# Patient Record
Sex: MALE | Race: WHITE
[De-identification: names, ages, dates, MRNs, and addresses within clinical notes are randomized per-mention and may not be internally consistent; named-entity substitution may affect disease eponyms.]

---

## 2017-12-01 ENCOUNTER — HOSPITAL ENCOUNTER (EMERGENCY)
Dept: HOSPITAL 17 - NEPC | Age: 67
Discharge: HOME | End: 2017-12-01
Payer: COMMERCIAL

## 2017-12-01 VITALS — DIASTOLIC BLOOD PRESSURE: 88 MMHG | SYSTOLIC BLOOD PRESSURE: 164 MMHG

## 2017-12-01 VITALS
OXYGEN SATURATION: 99 % | TEMPERATURE: 99 F | SYSTOLIC BLOOD PRESSURE: 237 MMHG | HEART RATE: 80 BPM | RESPIRATION RATE: 18 BRPM | DIASTOLIC BLOOD PRESSURE: 103 MMHG

## 2017-12-01 VITALS
DIASTOLIC BLOOD PRESSURE: 90 MMHG | OXYGEN SATURATION: 99 % | RESPIRATION RATE: 18 BRPM | SYSTOLIC BLOOD PRESSURE: 176 MMHG | HEART RATE: 80 BPM

## 2017-12-01 VITALS — HEIGHT: 73 IN | BODY MASS INDEX: 24.84 KG/M2 | WEIGHT: 187.39 LBS

## 2017-12-01 VITALS
SYSTOLIC BLOOD PRESSURE: 213 MMHG | OXYGEN SATURATION: 97 % | RESPIRATION RATE: 18 BRPM | DIASTOLIC BLOOD PRESSURE: 105 MMHG | HEART RATE: 75 BPM

## 2017-12-01 VITALS
HEART RATE: 70 BPM | OXYGEN SATURATION: 97 % | RESPIRATION RATE: 19 BRPM | DIASTOLIC BLOOD PRESSURE: 108 MMHG | SYSTOLIC BLOOD PRESSURE: 210 MMHG

## 2017-12-01 VITALS
SYSTOLIC BLOOD PRESSURE: 181 MMHG | RESPIRATION RATE: 18 BRPM | OXYGEN SATURATION: 99 % | HEART RATE: 74 BPM | DIASTOLIC BLOOD PRESSURE: 85 MMHG

## 2017-12-01 DIAGNOSIS — R60.0: Primary | ICD-10-CM

## 2017-12-01 DIAGNOSIS — E78.00: ICD-10-CM

## 2017-12-01 DIAGNOSIS — Z79.82: ICD-10-CM

## 2017-12-01 DIAGNOSIS — Z79.899: ICD-10-CM

## 2017-12-01 DIAGNOSIS — I10: ICD-10-CM

## 2017-12-01 LAB
ALP SERPL-CCNC: 60 U/L (ref 45–117)
ALT SERPL-CCNC: 25 U/L (ref 12–78)
ANION GAP SERPL CALC-SCNC: 9 MEQ/L (ref 5–15)
APTT BLD: 26.9 SEC (ref 24.3–30.1)
AST SERPL-CCNC: 25 U/L (ref 15–37)
BASOPHILS # BLD AUTO: 0 TH/MM3 (ref 0–0.2)
BASOPHILS NFR BLD: 0.6 % (ref 0–2)
BILIRUB SERPL-MCNC: 0.5 MG/DL (ref 0.2–1)
BUN SERPL-MCNC: 13 MG/DL (ref 7–18)
CHLORIDE SERPL-SCNC: 92 MEQ/L (ref 98–107)
EOSINOPHIL # BLD: 0.2 TH/MM3 (ref 0–0.4)
EOSINOPHIL NFR BLD: 3.3 % (ref 0–4)
ERYTHROCYTE [DISTWIDTH] IN BLOOD BY AUTOMATED COUNT: 13 % (ref 11.6–17.2)
GFR SERPLBLD BASED ON 1.73 SQ M-ARVRAT: 86 ML/MIN (ref 89–?)
HCO3 BLD-SCNC: 26.1 MEQ/L (ref 21–32)
HCT VFR BLD CALC: 36.1 % (ref 39–51)
HEMO FLAGS: (no result)
INR PPP: 1 RATIO
LYMPHOCYTES # BLD AUTO: 1.3 TH/MM3 (ref 1–4.8)
LYMPHOCYTES NFR BLD AUTO: 25.1 % (ref 9–44)
MCH RBC QN AUTO: 31.9 PG (ref 27–34)
MCHC RBC AUTO-ENTMCNC: 35 % (ref 32–36)
MCV RBC AUTO: 91.2 FL (ref 80–100)
MONOCYTES NFR BLD: 16.3 % (ref 0–8)
NEUTROPHILS # BLD AUTO: 2.8 TH/MM3 (ref 1.8–7.7)
NEUTROPHILS NFR BLD AUTO: 54.7 % (ref 16–70)
PLATELET # BLD: 337 TH/MM3 (ref 150–450)
POTASSIUM SERPL-SCNC: 3.9 MEQ/L (ref 3.5–5.1)
PROTHROMBIN TIME: 10.2 SEC (ref 9.8–11.6)
RBC # BLD AUTO: 3.96 MIL/MM3 (ref 4.5–5.9)
SODIUM SERPL-SCNC: 127 MEQ/L (ref 136–145)
URATE SERPL-MCNC: 3.1 MG/DL (ref 2.6–7.2)
WBC # BLD AUTO: 5.2 TH/MM3 (ref 4–11)

## 2017-12-01 PROCEDURE — 85610 PROTHROMBIN TIME: CPT

## 2017-12-01 PROCEDURE — 83880 ASSAY OF NATRIURETIC PEPTIDE: CPT

## 2017-12-01 PROCEDURE — 85652 RBC SED RATE AUTOMATED: CPT

## 2017-12-01 PROCEDURE — 80053 COMPREHEN METABOLIC PANEL: CPT

## 2017-12-01 PROCEDURE — 71010: CPT

## 2017-12-01 PROCEDURE — 96374 THER/PROPH/DIAG INJ IV PUSH: CPT

## 2017-12-01 PROCEDURE — 86140 C-REACTIVE PROTEIN: CPT

## 2017-12-01 PROCEDURE — 93970 EXTREMITY STUDY: CPT

## 2017-12-01 PROCEDURE — 85025 COMPLETE CBC W/AUTO DIFF WBC: CPT

## 2017-12-01 PROCEDURE — 99285 EMERGENCY DEPT VISIT HI MDM: CPT

## 2017-12-01 PROCEDURE — 85730 THROMBOPLASTIN TIME PARTIAL: CPT

## 2017-12-01 PROCEDURE — 84550 ASSAY OF BLOOD/URIC ACID: CPT

## 2017-12-01 NOTE — RADRPT
EXAM DATE/TIME:  12/01/2017 18:21 

 

[facility] COMPARISON:     

No previous studies available for comparison.

        

 

 

INDICATIONS :                

Bilateral leg swelling. 

            

 

MEDICAL HISTORY :     

Hypercholesterolemia. Hypertension.  Hyperlipidemia. 

 

SURGICAL HISTORY :     

Tonsillectomy. Hernia repair. Discectomy. Left achilles and miniscus repair. Vasectomy. 

 

ENCOUNTER:     

Initial

 

ACUITY:     

1 week

 

PAIN SCORE:      

2/10

 

LOCATION:      

Bilateral  legs. 

                       

 

TECHNIQUE:     

Venous ultrasound of the left and right leg was performed from the inguinal ligament to the proximal 
calf.  Real-time, color Doppler and spectral tracing, compression and augmentation techniques were us
ed.  

 

FINDINGS:     

 

RIGHT LEG:     

There is normal compressibility of the deep venous system from the inguinal region to the proximal ca
lf.  No echogenic clot is seen in the lumen of the common femoral, femoral, popliteal, and posterior 
tibial veins.  There is a normal response of the venous system to proximal and distal augmentation an
d respiration.  

 

LEFT LEG:     

There is normal compressibility of the deep venous system from the inguinal region to the proximal ca
lf.  No echogenic clot is seen in the lumen of the common femoral, femoral, popliteal, and posterior 
tibial veins.  There is a normal response of the venous system to proximal and distal augmentation an
d respiration.  

 

CONCLUSION:     

Normal examination.  

 

 

 

 Demetrius Baker MD on December 01, 2017 at 19:12           

Board Certified Radiologist.

 This report was verified electronically.

## 2017-12-01 NOTE — RADRPT
EXAM DATE/TIME:  12/01/2017 18:11 

 

[facility] COMPARISON:     

No previous studies available for comparison.

 

                     

INDICATIONS :     

High blood pressure.

                     

 

MEDICAL HISTORY :     

Hypertension.          

 

SURGICAL HISTORY :     

None.   

 

ENCOUNTER:     

Initial                                        

 

ACUITY:     

1 day      

 

PAIN SCORE:     

2/10

 

LOCATION:     

Bilateral chest 

 

FINDINGS:     

A single view of the chest demonstrates the lungs to be symmetrically aerated without evidence of mas
s, infiltrate or effusion.  The cardiomediastinal contours are unremarkable.  Osseous structures are 
intact.

 

CONCLUSION:     

1. No acute findings. Osteoarthritis of the a.c. joints.

 

 

 

 Demetrius Baker MD on December 01, 2017 at 18:26           

Board Certified Radiologist.

 This report was verified electronically.

## 2017-12-01 NOTE — PD
HPI


Chief Complaint:  Hypertension


Time Seen by Provider:  17:50


Travel History


International Travel<30 days:  No


Contact w/Intl Traveler<30days:  No


Traveled to known affect area:  No





History of Present Illness


HPI


67-year-old male complains of redness and swelling of the left ankle.  Patient 

denies any injury to the ankle.  Patient states that the symptoms started about 

7 days ago.  Patient noticed his blood pressure was also elevated the past 

several days.  Patient has history of hypertension.  Patient has been taking 

Losartan and HCTZ was added a month ago.  Patient denies any history of 

diabetes.  Patient is a nonsmoker.  Patient denies history of DVT or PE.  

Patient denies history of gouty arthritis.  Patient denies any fever chills.  

Patient denies any recent long distant travel.





PFSH


Past Medical History


High Cholesterol:  Yes


Hypertension:  Yes


Influenza Vaccination:  Yes





Past Surgical History


Abdominal Surgery:  Yes (HERNIA)


Tonsillectomy:  Yes


Other Surgery:  Yes (VASECTOMY)





Social History


Alcohol Use:  Yes (DAILY COUPLE GLASSES WINE)


Tobacco Use:  No


Substance Use:  No





Allergies-Medications


(Allergen,Severity, Reaction):  


Coded Allergies:  


     No Known Allergies (Verified , 12/1/17)


Reported Meds & Prescriptions





Reported Meds & Active Scripts


Active


Hydrochlorothiazide 25 Mg Tab 25 Mg PO DAILY


Simvastatin 20 Mg Tab 20 Mg PO DAILY


Losartan (Losartan Potassium) 100 Mg Tab 100 Mg PO DAILY


Reported


Fish Oil (Omega-3 Fatty Acids) 60 Mg-90 Mg-500 Mg Cap   


Multiple Vitamin 1 Tab 1 Tab PO DAILY


Aspirin 81 Mg Chew 81 Mg CHEW ONCE








Review of Systems


General / Constitutional:  No: Fever


Eyes:  No: Visual changes


HENT:  No: Headaches


Cardiovascular:  No: Chest Pain or Discomfort


Respiratory:  No: Shortness of Breath


Gastrointestinal:  No: Abdominal Pain


Genitourinary:  No: Dysuria


Musculoskeletal:  Positive: Edema, No: Pain


Skin:  No Rash


Neurologic:  No: Weakness


Psychiatric:  No: Depression


Endocrine:  No: Polydipsia


Hematologic/Lymphatic:  No: Easy Bruising





Physical Exam


Narrative


GENERAL: Well-nourished, well-developed patient.


SKIN: Focused skin assessment warm/dry.


HEAD: Normocephalic.


EYES: No scleral icterus. No injection or drainage. 


NECK: Supple, trachea midline. No JVD or lymphadenopathy.


CARDIOVASCULAR: Regular rate and rhythm without murmurs, gallops, or rubs. 


RESPIRATORY: Breath sounds equal bilaterally. No accessory muscle use.


GASTROINTESTINAL: Abdomen soft, non-tender, nondistended. 


MUSCULOSKELETAL: Patient has mild redness of the skin associated with the left 

ankle on the medial and lateral malleolus and distal aspect the left lower leg.

  No heat noted.  No tenderness on palpation.  Mild edema dorsal aspect the 

left foot.  Patient has some mild soft tissue swelling over the right ankle 

also.  No tenderness on palpation the calf area.  Negative Homans sign.


BACK: Nontender without obvious deformity. No CVA tenderness. 


Neurologic exam normal.





Data


Data


Last Documented VS





Vital Signs








  Date Time  Temp Pulse Resp B/P (MAP) Pulse Ox O2 Delivery O2 Flow Rate FiO2


 


12/1/17 18:16  75 18 213/105 (141) 97   


 


12/1/17 17:55      Room Air  


 


12/1/17 17:38 99.0       








Orders





 Orders


Complete Blood Count With Diff (12/1/17 17:59)


Comprehensive Metabolic Panel (12/1/17 17:59)


B-Type Natriuretic Peptide (12/1/17 17:59)


Prothrombin Time / Inr (Pt) (12/1/17 17:59)


Act Partial Throm Time (Ptt) (12/1/17 17:59)


C-Reactive Protein (Crp) (12/1/17 17:59)


Westergren Sedimentation Rate (12/1/17 17:59)


Chest, Single Ap (12/1/17 17:59)


Iv Access Insert/Monitor (12/1/17 17:59)


Ecg Monitoring (12/1/17 17:59)


Oximetry (12/1/17 17:59)


Uric Acid (12/1/17 17:59)


Us Leg Venous Doppler Bilat (12/1/17 18:03)


Hydralazine Inj (Apresoline Inj) (12/1/17 18:15)





Labs





Laboratory Tests








Test


  12/1/17


18:07











[facility]


Medical Decision Making


Medical Screen Exam Complete:  Yes


Emergency Medical Condition:  Yes


Differential Diagnosis


Differential diagnosis including gouty arthritis, dependent edema, CHF, DVT, 

cellulitis.


Narrative Course


67-year-old male redness and swelling left ankle.  Mild swelling the right 

ankle also.  Patient has history hypertension on HCTZ.











Kirk Sood MD Dec 1, 2017 18:12

## 2017-12-01 NOTE — PD
Physical Exam


Narrative


GENERAL: Well-nourished, well-developed patient.


SKIN: Patient has swelling of his bilateral ankles, there is redness to the 

bilateral lower legs with left greater than right.  There is no induration or 

abscess or crepitus


HEAD: Normocephalic and atraumatic.


EYES: No injection or drainage. 


ENT: No nasal drainage noted. 


NECK: Supple, trachea midline.


CARDIOVASCULAR: Regular rate and rhythm 


RESPIRATORY: No increased effort. No accessory muscle use.


EXTREMITIES: No significant specific joint pain, neurovascularly intact, no 

lacerations over, compartments soft.


NEUROLOGICAL: Awake and alert. Motor and sensory grossly within normal limits. 

Normal speech.





Data


Data


Last Documented VS





Vital Signs








  Date Time  Temp Pulse Resp B/P (MAP) Pulse Ox O2 Delivery O2 Flow Rate FiO2


 


12/1/17 19:28  74 18 181/85 (117) 99 Room Air  


 


12/1/17 17:38 99.0       








Orders





 Orders


Complete Blood Count With Diff (12/1/17 17:59)


Comprehensive Metabolic Panel (12/1/17 17:59)


B-Type Natriuretic Peptide (12/1/17 17:59)


Prothrombin Time / Inr (Pt) (12/1/17 17:59)


Act Partial Throm Time (Ptt) (12/1/17 17:59)


C-Reactive Protein (Crp) (12/1/17 17:59)


Westergren Sedimentation Rate (12/1/17 17:59)


Chest, Single Ap (12/1/17 17:59)


Iv Access Insert/Monitor (12/1/17 17:59)


Ecg Monitoring (12/1/17 17:59)


Oximetry (12/1/17 17:59)


Uric Acid (12/1/17 17:59)


Us Leg Venous Doppler Bilat (12/1/17 18:03)


Hydralazine Inj (Apresoline Inj) (12/1/17 18:15)


Ed Discharge Order (12/1/17 20:20)





Labs





Laboratory Tests








Test


  12/1/17


18:07


 


White Blood Count 5.2 TH/MM3 


 


Red Blood Count 3.96 MIL/MM3 


 


Hemoglobin 12.6 GM/DL 


 


Hematocrit 36.1 % 


 


Mean Corpuscular Volume 91.2 FL 


 


Mean Corpuscular Hemoglobin 31.9 PG 


 


Mean Corpuscular Hemoglobin


Concent 35.0 % 


 


 


Red Cell Distribution Width 13.0 % 


 


Platelet Count 337 TH/MM3 


 


Mean Platelet Volume 6.8 FL 


 


Neutrophils (%) (Auto) 54.7 % 


 


Lymphocytes (%) (Auto) 25.1 % 


 


Monocytes (%) (Auto) 16.3 % 


 


Eosinophils (%) (Auto) 3.3 % 


 


Basophils (%) (Auto) 0.6 % 


 


Neutrophils # (Auto) 2.8 TH/MM3 


 


Lymphocytes # (Auto) 1.3 TH/MM3 


 


Monocytes # (Auto) 0.8 TH/MM3 


 


Eosinophils # (Auto) 0.2 TH/MM3 


 


Basophils # (Auto) 0.0 TH/MM3 


 


CBC Comment DIFF FINAL 


 


Differential Comment  


 


Erythrocyte Sedimentation Rate 15 mm/hr 


 


Prothrombin Time 10.2 SEC 


 


Prothromb Time International


Ratio 1.0 RATIO 


 


 


Activated Partial


Thromboplast Time 26.9 SEC 


 


 


Blood Urea Nitrogen 13 MG/DL 


 


Creatinine 0.89 MG/DL 


 


Random Glucose 93 MG/DL 


 


Total Protein 7.2 GM/DL 


 


Albumin 3.8 GM/DL 


 


Calcium Level 8.8 MG/DL 


 


Uric Acid 3.1 MG/DL 


 


Alkaline Phosphatase 60 U/L 


 


Aspartate Amino Transf


(AST/SGOT) 25 U/L 


 


 


Alanine Aminotransferase


(ALT/SGPT) 25 U/L 


 


 


Total Bilirubin 0.5 MG/DL 


 


Sodium Level 127 MEQ/L 


 


Potassium Level 3.9 MEQ/L 


 


Chloride Level 92 MEQ/L 


 


Carbon Dioxide Level 26.1 MEQ/L 


 


Anion Gap 9 MEQ/L 


 


Estimat Glomerular Filtration


Rate 86 ML/MIN 


 


 


C-Reactive Protein 1.60 MG/DL 


 


B-Type Natriuretic Peptide 70 PG/ML 











MDM


Supervised Visit with ILIANA:  No


Interpretation(s)


BNP is normal


Narrative Course


Signed over to me to follow BNP and if normal to discharge





BNP is normal.  Lengthy discussion with patient and wife and they agree to keep 

blood pressure log and follow closely as an outpatient for medication 

adjustment.  No emergent indication for antibiotic at this time but patient and 

wife understand that if the redness worsens or if he develops a fever he needs 

to return for antibiotics.  Patient could be having inflammatory arthritis and 

for this was recommended anti-inflammatories.  He agrees to plan of care and 

return instructions.


Diagnosis





 Primary Impression:  


 Leg edema


 Additional Impression:  


 Hypertension


 Qualified Codes:  I10 - Essential (primary) hypertension


Patient Instructions:  General Instructions





***Additional Instruction:  


return as needed, follow with primary monday, keep blood pressure log, elevate 

legs at rest


***Med/Other Pt SpecificInfo:  No Change to Meds


Disposition:  01 DISCHARGE HOME


Condition:  Stable











Karen Ignacio MD Dec 1, 2017 20:26

## 2018-01-11 ENCOUNTER — HOSPITAL ENCOUNTER (OUTPATIENT)
Dept: HOSPITAL 17 - HDOC | Age: 68
Discharge: HOME | End: 2018-01-11
Attending: INTERNAL MEDICINE
Payer: COMMERCIAL

## 2018-01-11 VITALS
SYSTOLIC BLOOD PRESSURE: 150 MMHG | RESPIRATION RATE: 18 BRPM | TEMPERATURE: 98.2 F | HEART RATE: 65 BPM | OXYGEN SATURATION: 97 % | DIASTOLIC BLOOD PRESSURE: 88 MMHG

## 2018-01-11 VITALS — WEIGHT: 188.05 LBS | BODY MASS INDEX: 24.92 KG/M2 | HEIGHT: 73 IN

## 2018-01-11 DIAGNOSIS — R94.39: ICD-10-CM

## 2018-01-11 DIAGNOSIS — I50.30: ICD-10-CM

## 2018-01-11 DIAGNOSIS — Z01.818: ICD-10-CM

## 2018-01-11 DIAGNOSIS — I25.10: Primary | ICD-10-CM

## 2018-01-11 DIAGNOSIS — Z01.810: ICD-10-CM

## 2018-01-11 DIAGNOSIS — I11.0: ICD-10-CM

## 2018-01-11 DIAGNOSIS — I77.810: ICD-10-CM

## 2018-01-11 DIAGNOSIS — E78.5: ICD-10-CM

## 2018-01-11 DIAGNOSIS — E87.1: ICD-10-CM

## 2018-01-11 LAB
BASOPHILS # BLD AUTO: 0.1 TH/MM3 (ref 0–0.2)
BASOPHILS NFR BLD: 1 % (ref 0–2)
BUN SERPL-MCNC: 13 MG/DL (ref 7–18)
CALCIUM SERPL-MCNC: 9.1 MG/DL (ref 8.5–10.1)
CHLORIDE SERPL-SCNC: 98 MEQ/L (ref 98–107)
CREAT SERPL-MCNC: 1.05 MG/DL (ref 0.6–1.3)
EOSINOPHIL # BLD: 0.6 TH/MM3 (ref 0–0.4)
EOSINOPHIL NFR BLD: 10 % (ref 0–4)
ERYTHROCYTE [DISTWIDTH] IN BLOOD BY AUTOMATED COUNT: 13.1 % (ref 11.6–17.2)
GFR SERPLBLD BASED ON 1.73 SQ M-ARVRAT: 71 ML/MIN (ref 89–?)
GLUCOSE SERPL-MCNC: 84 MG/DL (ref 74–106)
HCO3 BLD-SCNC: 27.6 MEQ/L (ref 21–32)
HCT VFR BLD CALC: 38.7 % (ref 39–51)
HGB BLD-MCNC: 13.5 GM/DL (ref 13–17)
INR PPP: 1 RATIO
LYMPHOCYTES # BLD AUTO: 1.4 TH/MM3 (ref 1–4.8)
LYMPHOCYTES NFR BLD AUTO: 26.3 % (ref 9–44)
MCH RBC QN AUTO: 31.8 PG (ref 27–34)
MCHC RBC AUTO-ENTMCNC: 34.8 % (ref 32–36)
MCV RBC AUTO: 91.3 FL (ref 80–100)
MONOCYTE #: 0.8 TH/MM3 (ref 0–0.9)
MONOCYTES NFR BLD: 15.2 % (ref 0–8)
NEUTROPHILS # BLD AUTO: 2.6 TH/MM3 (ref 1.8–7.7)
NEUTROPHILS NFR BLD AUTO: 47.5 % (ref 16–70)
PLATELET # BLD: 390 TH/MM3 (ref 150–450)
PMV BLD AUTO: 6.9 FL (ref 7–11)
PROTHROMBIN TIME: 9.9 SEC (ref 9.8–11.6)
RBC # BLD AUTO: 4.24 MIL/MM3 (ref 4.5–5.9)
SODIUM SERPL-SCNC: 133 MEQ/L (ref 136–145)
WBC # BLD AUTO: 5.5 TH/MM3 (ref 4–11)

## 2018-01-11 PROCEDURE — 92928 PRQ TCAT PLMT NTRAC ST 1 LES: CPT

## 2018-01-11 PROCEDURE — C1769 GUIDE WIRE: HCPCS

## 2018-01-11 PROCEDURE — C1893 INTRO/SHEATH, FIXED,NON-PEEL: HCPCS

## 2018-01-11 PROCEDURE — 93458 L HRT ARTERY/VENTRICLE ANGIO: CPT

## 2018-01-11 PROCEDURE — 80048 BASIC METABOLIC PNL TOTAL CA: CPT

## 2018-01-11 PROCEDURE — C1725 CATH, TRANSLUMIN NON-LASER: HCPCS

## 2018-01-11 PROCEDURE — 85730 THROMBOPLASTIN TIME PARTIAL: CPT

## 2018-01-11 PROCEDURE — 99153 MOD SED SAME PHYS/QHP EA: CPT

## 2018-01-11 PROCEDURE — 93005 ELECTROCARDIOGRAM TRACING: CPT

## 2018-01-11 PROCEDURE — C1887 CATHETER, GUIDING: HCPCS

## 2018-01-11 PROCEDURE — C1874 STENT, COATED/COV W/DEL SYS: HCPCS

## 2018-01-11 PROCEDURE — 85025 COMPLETE CBC W/AUTO DIFF WBC: CPT

## 2018-01-11 PROCEDURE — 99152 MOD SED SAME PHYS/QHP 5/>YRS: CPT

## 2018-01-11 PROCEDURE — 85610 PROTHROMBIN TIME: CPT

## 2018-01-11 NOTE — MA
cc:

SUMIT BURT

****

 

 

DATE: 2018

 

 1950

 

PROCEDURE PERFORMED

1.  Left heart catheterization.

2.  Selective right and left coronary angiography.

3.  Left ventriculogram and hemodynamics.

4.  Successful PCI, RITO to proximal LAD.

 

APPROACH

Right transradial.

 

INDICATION

Positive stress test with complaints of dyspnea on exertion.

 

DESCRIPTION OF PROCEDURE

Consent signed.  The patient was brought into the cardiac cath lab in fasting 
state.  The right groin was prepped and draped in sterile fashion, using 1% 
lidocaine for local anesthesia and micropuncture kit, a 6-Italian sheath was 
inserted into the right radial artery. Antispasmodic cocktail given then 
selective right and left coronary angiography was performed with a JR-4 and a JL
-4 diagnostic catheters.  Angiography was taken in multiple views.  The JR-4 
diagnostic catheter was introduced over the wire to the ventricle.  This was 
followed by pressure recordings, left ventriculogram on pullback.  We

identified a significant lesion on the proximal LAD of 85%.  The remaining of 
the LAD had no significant obstruction with minimal luminal irregularities 
throughout and small flow for which we proceeded to fix by percutaneous 
intervention. For this the LAD was engaged with EBU 3 5 guide.  Then the LAD 
was wired with a run-through wire which was anchored distally in the LAD. This 
was followed by pre-dilation of the proximal LAD lesion with a 2.5 12 compliant 
balloon followed by insertion and deployment of a 3.5 12 drug-eluting stent. 
The stent was postdilated with a noncompliant 4 0 balloon to high atmospheres. 
Final angiographic views revealed good stent position and expansion with MURALI 
III flow.  The patient tolerated the procedure well without complications. 
Estimated blood loss less than 10 ccs. Total contrast used 100 ccs. The right 
radial access site was closed with a TR band.  The patient was loaded with 
Effient after the procedure.

 

RESULTS

 

LEFT VENTRICLE

The left ventricular pressure were 84/-3 with an LVEDP of 3. The aortic 
pressure was 97/52 with a mean of 71.  There was no gradient upon pullback from 
the left ventricle to the aorta.  Left ventriculogram revealed a symmetrically 
idania ventricle with an estimated ejection fraction of 60%.

 

ANGIOGRAPHY

1.  The right coronary artery is a small and nondominant vessel.  It has no 
significant obstructive coronary artery disease.

2.  The left main is very short and is giving off the LAD and the left 
circumflex artery.

3.  The LAD is a transapical vessel.  It has a significant 85% lesion on its 
proximal segment, is giving off one main diagonal vessel which is patent with 
MURALI III flow.  The remainder of the LAD has minimal luminal irregularities, 
tortuosity and some calcification, however no significant obstructions.  

4. The left circumflex artery is a dominant artery giving the PDA.  It does 
have some minimal luminal irregularities, has a 10% lesion proximally.  It is 
giving off two diagonal vessels and the PDA all of which are patent with MURALI 
III flow and nonobstructive coronary artery disease.

 

CONCLUSION

1.  Successful PCI / RITO to proximal LAD in the setting of positive stress test 
and complaints of YAO.

2.  Preserved LV systolic function.

 

RECOMMENDATIONS

Transfer to the DOC unit for post cath care with IV hydration for the next 4 
hours and continue dual antiplatelet agent with aspirin and Effient as well as 
aggressive medical management for secondary prevention of CAD. He should follow 
up with Dr. Harmon upon discharge.

 

 

                              _________________________________

                              Sumit Burt MD, MPH, [facility]

 

PCA/TLL

D:  2018/9:54 AM

T:  2018/10:15 AM

Visit #:  [id number]

Job #:  [id number]

[facility]

## 2018-01-11 NOTE — EKG
Date Performed: 01/11/2018       Time Performed: 07:32:18

 

PTAGE:      67 years

 

EKG:      Sinus rhythm 

 

. Normal ECG 

 

NO PREVIOUS TRACING            

 

DOCTOR:   Genna Tello  Interpretating Date/Time  01/11/2018 15:57:22

## 2018-01-11 NOTE — CATHPROC
[facility] HIS Report

Study Information

Study Number    Admission           Scheduled Start            Study Start

 

[id number]    Jan 11 2018 6:57AM      01/11/2018 Jan 11 2018 8:27AM

 

Universal Service

 

Cardiac Catheterization

 

Admit Source               Facility Department

 

Other                   [facility] - Cath Lab

 

Physician and Clinical Staff

Initial MD Meier, Sumit         Circulator     Valerie Day BSN

 

                         Recorder      Kimberlyn Dias,RT(R) (BS)

 

                         Scrub        Eleazar Pratt,RT(R)

 

Procedures Performed

Procedure                     Location (Site)           Vessel Name

 

Drug Eluting Inflatio               LAD Prox              Left Coronary

L Heart Cath

LV Gram-hand inj. LV               LV                 Ventricle

PTCA                       LAD Prox              Left Coronary

Wire insertion                  Radial (right)           Radial Art.

Equipment

Time            Description           Size       Mfg Part Number  Used/Scraped

                   COPILOT VALVE, BLEEDBACK              [id number]

09:33    [employer]                                     Used

                   CONTROL                      *[id number]

                   TRANSDUCER, TRUWAVE                [id number]

08:40    [employer]                      *                  Used

                   W/STOCKCOCK                    *[id number]

                                             [phone]



                                             *[id number]

                                             [phone]



                                             *[id number]

                                             [id number]

08:40    [employer]    PACK, CCL CUSTOM         *                  Used

                                             *[id number]

08:40    [employer]    SUPPORT, ARTERIAL ADULT              [id number] *[id number] Used

                                             [id number]

09:34    [employer]        BALLOON, 2.5 X 12MM EUPHORA 12MM                   Used

                                             *[id number]

                   BALLOON, 4.0 X 8MM NC               [id number]

09:46    [employer]                         8MM                 Used

                   EUPHORA                      *[id number]

                                             [id number]

08:50    [employer]        JL 3.5 DXTERITY CATHETER     FR 5                Used

                                             *[id number]

                                             [id number]

09:41    [employer]        STENT, 3.5 12MM KAITLYNN       3.5 12MM              Used

                                             *[id number]

                                             [id number]

09:38    [employer]        STENT, 3.5 15MM KAITLYNN       3.5 15MM              Used

                                             *[id number]

                                             [id number]

09:32    [employer]/AVE      EBU 3.5 Z2 GUIDE CATHETER    FR 6                Used

                                             *[id number]

                                             [id number]

09:33    [employer]      30 KATHY INDEFLATOR                          Used

                                             *[id number]

                   BAND, RADIAL COMPRESSION TR            [id number]

09:49    [employer]                       24CM                Used

                   SHORT 24                      *[id number]

                                             [id number]

08:40    [employer]      WIRE, 3MMJ .035 180CM      180CM                Used

                                             *[id number]

                                             [id number]

08:40    [employer]          MANIFOLD, 4 PORT         *                  Used

                                             *[id number]

08:40    [employer]         OMNIPAQUE, 350 MG, 150ML     150ML       [id number]      Used

                                             [id number]

08:40    [employer]      BLANKET,WARM AIR CCL       *                  Used

                                             *[id number]

                   SHEATH, FR6 TRANSRADIAL              [id number]

08:40    [employer]                      FR 6                Used

                   SLENDER 10CM                    *[id number]

                   WIRE, RUNTHROUGH NS FLOPPY             

09:32    [employer]                      180CM                Used

                   .014 180CM                     *[id number]

 

Equipment Model, Serial, Lot Number and Expiration Data

Description              Model Number       Serial Number    Lot Number       Expiration Date

 

BALLOON, 4.0 X 8MM NC EUPHORA                           [id number]        09-

 

STENT, 3.5 12MM KAITLYNN         [id number]                 [id number]       10-

 

History: Current Medications

Medication         Dosage/Unit       Route      Frequency   Last Date/Time Taken

 

Beta Blocker

 

Statins (any)

 

ASA

 

 

History: Allergies

Allergy              Reaction

 

No Known Allergies

History: Risk Factors

                      Family History of

Hypertension    Dyslipidemia                   Previous MI    Previous Heart Failure

                      Premature CAD

Yes         Yes           Yes            No         Yes

Prior Valve

          Prior PCI        Prior CABG

Surgery

No         No           No

          Cerebrovascular     Peripheral Artery     Chronic Lung

On Dialysis                                       Diabetes

          Disease         Disease          Disease

No         No           No            No         No

 

 

History: Stress Tests

Stress or Imaging Studies Performed

 

Yes

Standard Exercise Stress

Test

No

 

Stress Echo

 

No

 

Stress Test SPECT        Stress Test SPECT Result    Stress Test SPECT Ischemia Risk/Extent

 

Yes               Positive            Low

 

Stress Test CMR

 

No

 

Cardiac CTA           Coronary Calcium Score

 

No               No

 

 

History: Other

Current Smoker

 

No

 

Labs

Hgb (g/dl)        Hct (%)         WBC (l/cumm)        Platelets (thousands)

 

11.60-17.00       35.00-51.00       4.00-11.00         150..00

 

13.5           38.7          5.5             390

 

Glucose (mg/dl)     BUN (mg/dl)       Creatinine (mg/dl)    BUN:Creatinine (1:x)

74..00       7.00-18.00       0.50-1.30         10.00-20.00

 

84            13           1.0            13

 

Na (meq/l)        K (meq/l)

 

136..00      3.50-5.10

 

133           4.3

 

INR (PTT:PT)

 

0.90-1.10

 

1

 

CPK-MB (ng/ML)

 

0.50-3.60

 

Not Drawn

Medication

Medication Total Dose (Bolus/Oral)

Medication              Total Dosage/Unit

 

1% XYLOCAINE                 1 mL

 

EFFIENT                  60 mg

 

FENTANYL                  100 mcg

 

HEPARIN                 6000 units

 

RADIAL COCKTAIL               1 units

 

VERSED                    4 mg

 

Medications (Bolus/Oral)

Medication          Time Given          Dosage/Unit       Administered By      Reason

 

VERSED            1/11/2018 9:14:39 AM      2 mg         Valerie Day

2 mg VERSED given in lab by Valerie Day BSN in Left Antecubital via Peripheral IV.

 

1% XYLOCAINE         1/11/2018 9:15:17 AM      1 mL         Baker-Thu, Sumit

1 mL 1% XYLOCAINE given in lab by BakerAntThu Sumit in Right Radial via Subcutaneous.

 

FENTANYL           1/11/2018 9:15:46 AM      50 mcg         Valerie Day

50 mcg FENTANYL given in lab by Valerie Day BSN in Left Antecubital via Peripheral IV.

 

                                                       Ntg 200mcg Verapamil 2.5mg Heparin

RADIAL COCKTAIL        1/11/2018 9:16:30 AM      1 units        Valerie Day

                                                       3000U

1 units RADIAL COCKTAIL given in Right Radial via Radial. Reason: Ntg 200mcg Verapamil 2.5mg Heparin 
2500U.

 

VERSED            1/11/2018 9:18:57 AM      1 mg         Valerie Day

1 mg VERSED given in lab by Valerie Day BSN in Left Antecubital via Peripheral IV.

 

FENTANYL           1/11/2018 9:19:03 AM      25 mcg         Valerie Day

25 mcg FENTANYL given in lab by Valerie Day BSN in Left Antecubital via Peripheral IV.

 

HEPARIN            1/11/2018 9:30:54 AM     6000 units        Valerie Day

6000 units HEPARIN given in lab by Valerie Day BSN in Left Antecubital via Peripheral IV.

 

VERSED            1/11/2018 9:31:09 AM      1 mg         Barb Valerie

1 mg VERSED given in lab by Valerie Day BSN in Left Antecubital via Peripheral IV.

 

FENTANYL           1/11/2018 9:32:20 AM      25 mcg         Valerie Day

25 mcg FENTANYL given in lab by Valerie Day BSN in Left Antecubital via Peripheral IV.

 

EFFIENT            1/11/2018 9:56:31 AM      60 mg         Valerie Day

60 mg EFFIENT given in lab by Valerie Day BSN via Oral.

 

Medication (Drip)

Medication          Time Given          Dosage/Unit      Concentration/Unit  Diluent (ml)  Solution

 

IV Solutions         1/11/2018 8:27:32 AM      0 mL (IV)                 500       NaCl .9

IV Solutions given in lab by Valerie Day BSN in Left Antecubital via Peripheral IV. Pump/Dri
p Flow = 30 ml/hr using NaCl .9.

Initial Case Assessment

Cardiovascular

HR           Rhythm          NIBP          Chest Pain

 

66           reg            149/83         0

 

Edema Present      Skin color            Skin

 

None           Normal              Warm Dry

 

Circulatory - Right Pulses

Dorsalis Pedis     Femoral          Radial

 

1            3             3

 

Scale (0,1,2,3,4,d)

 

Scale (0,1,2,3,4,d)

 

Circulatory - Lower Extremities

Color Lower Right    Color Lower Left

 

 

Normal         Normal

 

Neurological State

            Oriented to time-place-

Alert                      Moves all extremities

            person

 

Respiration - General

Respiration Rate

            SpO2 (%)

(B/min)

10           98

 

Chronological Log

Time    Study Chronological Log

 

8:27:15   Patient arrived via Bed.

 

8:27:16   Patient Name, D.O.B, / Armband Verified By R.N.

 

8:27:16   Consent signed by the physician and the patient and verified by the Cath Lab staff.

 

8:27:17   Pre-op and post- op instructions given; patient acknowledges understanding of instructions.


 

8:27:18   Verbal Stimulation=2 Physical Stimulation=2 Airway=2 Respiration=2 TOTAL=8. (0=absent, 1=li
mited, 2=present)

 

8:27:19   Presedation assessment performed by Cath Lab RN.

 

8:27:24   Allens test performed on the right radial and ulnar artery.

 

8:27:27   Patient has been NPO for More than 6Hrs.

 

8:27:28   Skin Breakdown none per pt

 

8:27:28   Patient Warmer Placed on the Table.

 

8:27:30   Lilia Prominences Protected

 

8:27:31   A # 20 IV was noted in the Antecubital (left). Grade = 0

      IV Solutions given in lab by Valerie Day BSN in Left Antecubital via Peripheral IV. Pu
mp/Drip Flow = 30 ml/hr

8:27:32

      using NaCl .9.

8:27:33   History and physical on the chart or being dictated.

     Assessment: Initial Case, HR=66 BPM, Rhythm=reg, WPMP=616/83 mmhg, Chest Pain=0, Edema=None, Col
or=Normal,

     Skin = Warm, Dry

     Right Pulses: Pato Ped=1, Femoral=3, Radial=3

8:27:36  Lower Right Extremities: Color=Normal

     Lower Left Extremities: Color=Normal

     Neurological: State=Alert, Ox3, MURPHY

     Respiration: Resp=10 B/min, SpO2=98 %

     Vitals capture started with the following parameters, Patient=Adult, Interval=5 min, Initial Pre
njklc=805 mmHg,

8:33:41

     Deflation Rate=5 mmHg, Cuff placed on Left Arm

8:34:16  Reference ECG taken

 

8:34:55  HR=66 bpm, VWDD=040/83 mmhg, SpO2=98.0 %, Resp=14 B/min, Pain=0, Lilliana=10, Strong=2

 

8:38:35  Right Radial and right groin prepped with 2% chlorhexidine, and draped after a 3 min. waitin
g time.

 

8:39:21  HR=65 bpm, ZNWI=339/84 mmhg, SpO2=98.0 %, Resp=11 B/min, Pain=0, Lilliana=10, Strong=2

 

8:44:22  HR=65 bpm, KJFA=522/77 mmhg, NpR2=034.0 %, Resp=10 B/min, Pain=0, Lilliana=10, Strong=2

 

8:44:51  MD paged

 

8:45:53  Pressure channel 1 zeroed.

 

8:47:59  MD responded

 

8:49:25  HR=65 bpm, VMXP=539/81 mmhg, DfU5=662.0 %, Resp=8 B/min, Pain=0, Lilliana=10, Strong=2

 

8:54:24  HR=64 bpm, DHHY=580/78 mmhg, NyB0=720.0 %, Resp=12 B/min, Pain=0, Lilliana=10, Strong=2

 

8:59:24  HR=66 bpm, HHJZ=047/77 mmhg, SpO2=99.0 %, Resp=10 B/min, Pain=0, Lilliana=10, Strong=2

 

9:04:23  HR=64 bpm, OAHY=167/80 mmhg, SpO2=99.0 %, Resp=11 B/min, Pain=0, Lilliana=10, Strong=2

 

9:07:11  MD arrived

 

9:09:26  HR=67 bpm, MNOJ=204/78 mmhg, DdW0=830.0 %, Resp=17 B/min, Pain=0, Lilliana=10, Strong=2

 

9:14:23  HR=68 bpm, UNGQ=315/87 mmhg, SpO2=99.0 %, Resp=11 B/min, Pain=0, Lilliana=10, Strong=2

     Time Out. Correct patient, correct procedure, correct physician, power injector not loaded with 
contrast with surgical

9:14:23

     team present. Time Out Concurred by MD and individual staff in procedure.

9:14:34  Case Start

 

9:14:39  2 mg VERSED given in lab by Valerie Day BSN in Left Antecubital via Peripheral IV.

 

9:15:17  1 mL 1% XYLOCAINE given in lab by Sumit Meier in Right Radial via Subcutaneous.

 

9:15:46  50 mcg FENTANYL given in lab by Valerie Day BSN in Left Antecubital via Peripheral 
IV.

 

9:16:10  Access site was right Radial Artery.

     A SHEATH, FR6 TRANSRADIAL SLENDER 10CM FR 6 was advanced into the Radial (right) using the Sushil mcclendon

9:16:20

     technique.

9:16:30  1 units RADIAL COCKTAIL given in Right Radial via Radial. Reason: Ntg 200mcg Verapamil 2.5mg
 Heparin 2500U.

     A JR 4.0 INFINITI CATHETER FR 5 was advanced over a wire. OMNIPAQUE, 350 MG, 150ML 150ML was use
d for

9:17:02

     injections.

     Recorded Pressure: LV, HR=70, Condition=Condition 1

9:17:29

     (Left Ventricle) /-3/5

9:17:38  The LV was manually injected with 8 cc's and visualized. OMNIPAQUE, 350 MG, 150ML 150ML used
.

     Recorded Pressure: LV, Ao, HR=76, Condition=Condition 1

9:17:54  (Left Ventricle) LV 84/-3/3,

     (Aorta) Ao 107/58/81

9:18:57  1 mg VERSED given in lab by Valerie Day , CATHERINEN in Left Antecubital via Peripheral IV.

 

9:19:03  25 mcg FENTANYL given in lab by Valerie Day , BSN in Left Antecubital via Peripheral 
IV.

 

9:19:26  HR=71 bpm, TCXX=927/71 mmhg, SpO2=95.0 %, Resp=13 B/min, Pain=0, Lilliana=10, Strong=2

     After removing the current catheter a JL 3.5 DXTERITY CATHETER FR 5 was advanced over a WIRE, 3M
MJ .035 180CM

9:19:54

     180CM.

9:23:30  Catheter was removed

     A AL 1 INFINITI CATHETER FR 5 was advanced over a wire. OMNIPAQUE, 350 MG, 150ML 150ML was used 
for

9:23:47

     injections.

9:24:21  HR=66 bpm, NLEO=044/70 mmhg, SpO2=94.0 %, Resp=10 B/min, Pain=0, Lilliana=10, Strong=2

     Recorded Pressure: Ao, HR=65, Condition=Condition 1

9:25:26

     (Aorta) Ao 97/52/71

9:28:52  Catheter was removed

 

9:29:24  HR=66 bpm, ATJI=037/70 mmhg, SpO2=95.0 %, Resp=11 B/min, Pain=0, Lilliana=10, Strong=2

 

9:30:54  6000 units HEPARIN given in lab by Valerie Day BSN in Left Antecubital via Peripher
al IV.

 

9:31:09  1 mg VERSED given in lab by Valerie Day , BSN in Left Antecubital via Peripheral IV.

     A EBU 3.5 Z2 GUIDE CATHETER FR 6 was advanced over a wire. OMNIPAQUE, 350 MG, 150ML 150ML was us
ed for

9:31:36

     injections.

9:32:20  25 mcg FENTANYL given in lab by Valerie Day , BSN in Left Antecubital via Peripheral 
IV.

 

9:32:37  A WIRE, RUNTHROUGH NS FLOPPY .014 180CM 180CM was inserted via Radial (right).

     A BALLOON, 2.5 X 12MM EUPHORA 12MM was inserted over WIRE, RUNTHROUGH NS FLOPPY .014 180CM 180CM
 via

9:34:06

     the Radial (right).

9:34:23  HR=64 bpm, OVBS=396/67 mmhg, SpO2=95.0 %, Resp=10 B/min, Pain=0, Lilliana=10, Strong=2

     A BALLOON, 2.5 X 12MM EUPHORA 12MM was inserted over WIRE, RUNTHROUGH NS FLOPPY .014 180CM 180CM
 via

9:36:36

     the Radial (right).

     A BALLOON, 2.5 X 12MM EUPHORA 12MM over a WIRE, RUNTHROUGH NS FLOPPY .014 180CM 180CM in the LAD


9:36:49

     Prox was inflated using a 30 KATHY INDEFLATOR at 10 kathy for 20 sec.

9:37:21  Balloon Removed

     A STENT, 3.5 15MM KAITLYNN 3.5 15MM was advanced through a EBU 3.5 Z2 GUIDE CATHETER FR 6 over a WIR
E,

9:38:04

     RUNTHROUGH NS FLOPPY .014 180CM 180CM.

9:39:24  HR=64 bpm, HGBO=842/71 mmhg, SpO2=96.0 %, Resp=15 B/min, Pain=0, Lilliana=10, Strong=2

 

9:41:04  Stent not deployed. Stent removed and intact.

     A STENT, 3.5 12MM KAITLYNN 3.5 12MM was advanced through a EBU 3.5 Z2 GUIDE CATHETER FR 6 over a WIR
E,

9:41:44

     RUNTHROUGH NS FLOPPY .014 180CM 180CM.

9:43:19  Stent not deployed. Stent removed and intact.

 

9:44:25  HR=64 bpm, TAIV=667/72 mmhg, SpO2=97.0 %, Resp=12 B/min, Pain=0, Lilliana=10, Strong=2

     A STENT, 3.5 12MM KAITLYNN 3.5 12MM was deployed using a 30 KATHY INDEFLATOR at 12 atmospheres for 10 
seconds in

9:44:43

     the LAD Prox.

9:46:00  Delivery device removed

     A BALLOON, 4.0 X 8MM NC EUPHORA 8MM was inserted over WIRE, RUNTHROUGH NS FLOPPY .014 180CM 180C
M via

9:46:52

     the Radial (right).

     A BALLOON, 4.0 X 8MM NC EUPHORA 8MM over a WIRE, RUNTHROUGH NS FLOPPY .014 180CM 180CM in the LA
D

9:47:37

     Prox was inflated using a 30 KATHY INDEFLATOR at 14 kathy for 15 sec.

9:49:26  HR=64 bpm, LLFW=175/71 mmhg, SpO2=97.0 %, Resp=10 B/min, Pain=0, Lilliana=10, Strong=2

 

9:49:31  Balloon Removed

 

9:50:01  Catheter was removed

 

9:50:03  Wire removed

 

9:50:09  Case End

 

9:51:32  DOCU called. Spoke to Mer.

 

9:51:49  Catheter(s) removed without difficulty

 

9:51:55  No case complications noted.

 

9:52:00  A Left Heart Cath was performed.

     Radial Compression Device Used. 11 mLs of air placed in BAND, RADIAL COMPRESSION TR SHORT 24 24C
M. Affected

9:52:08

     hand 99 % O2 saturation.

9:54:27  HR=66 bpm, GFRC=801/78 mmhg, SpO2=97.0 %, Resp=17 B/min, Pain=0, Lilliana=10, Strong=2

9:56:31    60 mg EFFIENT given in lab by Valerie Day BSN via Oral.

 

9:59:26    CYUF=858/84 mmhg, Pain=0, Lilliana=10, Strong=2

 

10:00:06   Vitals capture stopped.

 

10:03:17   Patient moved to [facility]

 

 

 

 

End Study - Contrast Media Used In Study

Contrast       Total Opened (mL)   Total Used (mL)      Total Wasted (mL)

 

Omnipaque       110          110            0

 

End Study - Maximum Contrast Load

Max Contrast Load (mL)

 

426.6

 

End Study - Radiation Exposure

Fluoro Time

(minutes)

14.2

 

End Study - Sheaths

Sheaths Pulled By              Sheath Hold Time (min)

 

Eleazar Pratt

 

 

End Study - Patient Disposition

Complications     Transferred To        Interventional Outcome

 

No           Cath Lab Holding       successful